# Patient Record
Sex: FEMALE | Race: WHITE
[De-identification: names, ages, dates, MRNs, and addresses within clinical notes are randomized per-mention and may not be internally consistent; named-entity substitution may affect disease eponyms.]

---

## 2019-01-08 ENCOUNTER — HOSPITAL ENCOUNTER (EMERGENCY)
Dept: HOSPITAL 25 - UCEAST | Age: 54
Discharge: HOME | End: 2019-01-08
Payer: COMMERCIAL

## 2019-01-08 VITALS — SYSTOLIC BLOOD PRESSURE: 126 MMHG | DIASTOLIC BLOOD PRESSURE: 81 MMHG
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
## 2019-01-08 DIAGNOSIS — Z88.8: ICD-10-CM
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
## 2019-01-08 DIAGNOSIS — S30.0XXD: Primary | ICD-10-CM

## 2019-01-08 PROCEDURE — 99211 OFF/OP EST MAY X REQ PHY/QHP: CPT

## 2019-01-08 PROCEDURE — 72110 X-RAY EXAM L-2 SPINE 4/>VWS: CPT

## 2019-01-08 PROCEDURE — G0463 HOSPITAL OUTPT CLINIC VISIT: HCPCS

## 2019-01-08 NOTE — UC
Back Pain HPI





- HPI Summary


HPI Summary: 





52 yo female presents with left SI pain. She tells me that this morning she 

slipped on the ice and landed on her buttocks. Since that time she has had pain 

in her left buttocks/SI region. When she fell, she did hit the back of her head

, but was wearing a thick hat and had no LOC. Currently does not have a headache

, dizziness, vision changes. She took some ibuprofen for her back, which did 

help. She is most concerned about a fracture as she is going to be doing a lot 

of moving and heavy lifting over the next 3-4 days and does not want to make 

anything worse. Denies radiation of pain, numbness, tingling, saddle anesthesia

, or loss of bowel/bladder control. 








- History of Current Complaint


Chief Complaint: UCBackPain


Stated Complaint: BACK PAIN


Time Seen by Provider: 01/08/19 13:34


Hx Obtained From: Patient


Hx Last Menstrual Period: 6/7/15


Onset/Duration: Sudden Onset


Timing: Constant


Severity Initially: Moderate


Severity Currently: Moderate


Pain Intensity: 6


Pain Scale Used: 0-10 Numeric





- Allergies/Home Medications


Allergies/Adverse Reactions: 


 Allergies











Allergy/AdvReac Type Severity Reaction Status Date / Time


 


cyclobenzaprine Allergy  See Comment Verified 01/08/19 13:25


 


shellfish derived Allergy  Anaphylatic Verified 01/08/19 13:25





   Shock  











Home Medications: 


 Home Medications





Ibuprofen 600 mg PO ONCE PRN 01/08/19 [History Confirmed 01/08/19]











PMH/Surg Hx/FS Hx/Imm Hx


Respiratory History: Asthma


Psychological History: Anxiety, Depression





- Surgical History


Surgical History: Yes


Surgery Procedure, Year, and Place: APPENDECTOMY, LEFT BREAST LUMPECTOMY





- Family History


Known Family History: Positive: Cardiac Disease





- Social History


Occupation: Employed Full-time


Lives: With Family


Alcohol Use: Weekly


Substance Use Type: None


Smoking Status (MU): Never Smoked Tobacco





Review of Systems


All Other Systems Reviewed And Are Negative: Yes


Constitutional: Positive: Negative


Skin: Positive: Negative


Respiratory: Positive: Negative


Cardiovascular: Positive: Negative


Neurovascular: Positive: Negative


Musculoskeletal: Positive: Other: - Left buttocks/SI pain


Neurological: Positive: Negative


Psychological: Positive: Negative





Physical Exam





- Summary


Physical Exam Summary: 





GENERAL: NAD. WDWN. No pain distress.


SKIN: No rashes, sores, lesions, or open wounds.


NECK: Supple. FROM. Nontender. 


CHEST:  CTAB. No r/r/w. No accessory muscle use. Breathing comfortably and in 

no distress.


CV:  RRR. Without m/r/g. Pulses intact. Cap refill <2seconds


MSK: TTP over left SI. Mild pain with flexion and extension of spine. Positive 

SLR on left for low back pain without radiation. Strength 5/5 B/L LEs including 

dorsiflexion and plantar flexion. FROM B/L LEs. No edema. 


NEURO: AAOx3. Sensations intact B/L LEs L3-S1.


PSYCH: Age appropriate behavior.


Triage Information Reviewed: Yes


Vital Signs: 


 Initial Vital Signs











Temp  97.6 F   01/08/19 13:19


 


Pulse  80   01/08/19 13:19


 


Resp  18   01/08/19 13:19


 


BP  126/81   01/08/19 13:19


 


Pulse Ox  100   01/08/19 13:19











Vital Signs Reviewed: Yes





Back Pain Course/Dx





- Course


Course Of Treatment: XR:  IMPRESSION:  #. No radiographic evidence for 

traumatic lumbar sacral spine injury.  Discussed results with pt - suspect 

contusion due to fall. Advised to rest and apply ice. Continue taking ibuprofen 

for discomfort.





- Differential Dx/Diagnosis


Provider Diagnosis: 


 Contusion, Fall








Discharge





- Sign-Out/Discharge


Documenting (check all that apply): Patient Departure


All imaging exams completed and their final reports reviewed: Yes





- Discharge Plan


Condition: Stable


Disposition: HOME


Patient Education Materials:  Contusion in Adults (ED)


Referrals: 


Jose Bolden MD [Primary Care Provider] - 


Additional Instructions: 


If you develop a fever, shortness of breath, chest pain, new or worsening 

symptoms - please call your PCP or go to the ED.


 


1) Rest and apply ice to your area of discomfort


2) May take ibuprofen for pain





- Billing Disposition and Condition


Condition: STABLE


Disposition: Home